# Patient Record
Sex: FEMALE | Race: BLACK OR AFRICAN AMERICAN | NOT HISPANIC OR LATINO | Employment: FULL TIME | URBAN - METROPOLITAN AREA
[De-identification: names, ages, dates, MRNs, and addresses within clinical notes are randomized per-mention and may not be internally consistent; named-entity substitution may affect disease eponyms.]

---

## 2019-08-16 ENCOUNTER — APPOINTMENT (OUTPATIENT)
Dept: CT IMAGING | Facility: HOSPITAL | Age: 54
End: 2019-08-16

## 2019-08-16 ENCOUNTER — HOSPITAL ENCOUNTER (EMERGENCY)
Facility: HOSPITAL | Age: 54
Discharge: HOME | End: 2019-08-16
Attending: EMERGENCY MEDICINE | Admitting: EMERGENCY MEDICINE

## 2019-08-16 VITALS
HEIGHT: 63 IN | HEART RATE: 79 BPM | OXYGEN SATURATION: 96 % | BODY MASS INDEX: 36.32 KG/M2 | WEIGHT: 205 LBS | SYSTOLIC BLOOD PRESSURE: 156 MMHG | RESPIRATION RATE: 18 BRPM | TEMPERATURE: 98.3 F | DIASTOLIC BLOOD PRESSURE: 95 MMHG

## 2019-08-16 DIAGNOSIS — S00.83XA CONTUSION OF FACE, INITIAL ENCOUNTER: ICD-10-CM

## 2019-08-16 DIAGNOSIS — S13.9XXA NECK SPRAIN, INITIAL ENCOUNTER: ICD-10-CM

## 2019-08-16 DIAGNOSIS — S09.90XA INJURY OF HEAD, INITIAL ENCOUNTER: Primary | ICD-10-CM

## 2019-08-16 DIAGNOSIS — S06.0X0A CONCUSSION WITHOUT LOSS OF CONSCIOUSNESS, INITIAL ENCOUNTER: ICD-10-CM

## 2019-08-16 PROCEDURE — 72125 CT NECK SPINE W/O DYE: CPT

## 2019-08-16 PROCEDURE — 70486 CT MAXILLOFACIAL W/O DYE: CPT

## 2019-08-16 PROCEDURE — 99284 EMERGENCY DEPT VISIT MOD MDM: CPT

## 2019-08-16 PROCEDURE — 70450 CT HEAD/BRAIN W/O DYE: CPT

## 2019-08-16 RX ORDER — PREGABALIN 100 MG/1
100 CAPSULE ORAL 2 TIMES DAILY
COMMUNITY
End: 2019-10-16

## 2019-08-16 RX ORDER — ACETAMINOPHEN 325 MG/1
650 TABLET ORAL ONCE
Status: COMPLETED | OUTPATIENT
Start: 2019-08-16 | End: 2019-08-16

## 2019-08-16 RX ORDER — ONDANSETRON 4 MG/1
4 TABLET, ORALLY DISINTEGRATING ORAL ONCE
Status: COMPLETED | OUTPATIENT
Start: 2019-08-16 | End: 2019-08-16

## 2019-08-16 RX ORDER — LOSARTAN POTASSIUM 25 MG/1
25 TABLET ORAL DAILY
COMMUNITY
End: 2019-10-16

## 2019-08-16 RX ORDER — DULOXETIN HYDROCHLORIDE 60 MG/1
60 CAPSULE, DELAYED RELEASE ORAL DAILY
COMMUNITY

## 2019-08-16 RX ADMIN — ONDANSETRON 4 MG: 4 TABLET, ORALLY DISINTEGRATING ORAL at 15:26

## 2019-08-16 RX ADMIN — ACETAMINOPHEN 650 MG: 325 TABLET, FILM COATED ORAL at 15:26

## 2019-08-16 NOTE — ED PROVIDER NOTES
"Subjective   Ms. Magda Begum is a 53 y.o. female who presents to the ED with c/o work injury. She reports she was at work and attempting to move a large mixer when it hit her in the face. She states she was \"in and out\" of consciousness. She was advised to the ED. She now complains of headache, neck pain, nausea, abrasion to the nose, and wound to the left forearm from last week. She denies vomiting and nosebleed. She has a history of fibromyalgia. She is not anticoagulated. She takes Losartan and Lyrica. She has an appointment with a rheumatoid specialist on September 3, 2019. There are no other acute complaints at this time.        History provided by:  Patient  Head Injury   Location:  Frontal  Time since incident:  4 hours  Mechanism of injury: work    Pain details:     Severity:  Moderate    Duration:  4 hours    Timing:  Constant    Progression:  Unchanged  Chronicity:  New  Relieved by:  None tried  Ineffective treatments:  None tried  Associated symptoms: headache, loss of consciousness (she was \"in and out\" of consciousness), nausea and neck pain    Associated symptoms: no vomiting    Risk factors: obesity    Risk factors: not elderly        Review of Systems   HENT: Negative for nosebleeds.    Gastrointestinal: Positive for nausea. Negative for vomiting.   Musculoskeletal: Positive for neck pain.   Skin: Positive for wound (new abrasion to the nose and old abrasion to right forearm).   Neurological: Positive for loss of consciousness (she was \"in and out\" of consciousness) and headaches.   All other systems reviewed and are negative.      Past Medical History:   Diagnosis Date   • Fibromyalgia    • Hypertension        No Known Allergies    Past Surgical History:   Procedure Laterality Date   • HYSTERECTOMY         History reviewed. No pertinent family history.    Social History     Socioeconomic History   • Marital status: Single     Spouse name: Not on file   • Number of children: Not on file   • " Years of education: Not on file   • Highest education level: Not on file   Tobacco Use   • Smoking status: Current Every Day Smoker   Substance and Sexual Activity   • Alcohol use: No     Frequency: Never   • Drug use: No         Objective   Physical Exam   Constitutional: She is oriented to person, place, and time. She appears well-developed and well-nourished.   HENT:   Head: Normocephalic.   Contusion to the center of the forehead.  Diffuse tenderness to the forehead, periorbital region, and the nose.   Eyes: Conjunctivae are normal. No scleral icterus.   Neck: Normal range of motion. Neck supple.   C-spine tenderness upon exam.   Cardiovascular: Normal rate, regular rhythm and normal heart sounds.   No murmur heard.  Pulmonary/Chest: Effort normal and breath sounds normal. No respiratory distress.   Abdominal: Soft. There is no tenderness.   Musculoskeletal: Normal range of motion.   Neurological: She is alert and oriented to person, place, and time.   Skin: Skin is warm and dry.   Abrasion to the bridge of the nose.   Psychiatric: She has a normal mood and affect. Her behavior is normal.   Nursing note and vitals reviewed.      Procedures         ED Course     Discussed results and tx plan with patient. CT's WNL. Pt will f/u with [facility].     No results found for this or any previous visit (from the past 24 hour(s)).  Note: In addition to lab results from this visit, the labs listed above may include labs taken at another facility or during a different encounter within the last 24 hours. Please correlate lab times with ED admission and discharge times for further clarification of the services performed during this visit.    CT Facial Bones Without Contrast   Final Result   No evidence of fracture of the facial bones.       D:  08/16/2019   E:  08/16/2019               This report was finalized on 8/16/2019 6:49 PM by Dr. Hiwot Aldana MD.          CT Head Without Contrast   Final Result   No acute  "intracranial abnormality.       D:  08/16/2019   E:  08/16/2019               This report was finalized on 8/16/2019 6:49 PM by Dr. Hiwot Aldana MD.          CT Cervical Spine Without Contrast   Final Result   Multilevel degenerative changes seen within the cervical   spine with no evidence of fracture or dislocation with straightening of   the normal lordosis.       D:  08/16/2019   E:  08/16/2019               This report was finalized on 8/16/2019 5:18 PM by Dr. Hiwot Aldana MD.            Vitals:    08/16/19 1348   BP: 156/95   BP Location: Left arm   Patient Position: Sitting   Pulse: 79   Resp: 18   Temp: 98.3 °F (36.8 °C)   TempSrc: Oral   SpO2: 96%   Weight: 93 kg (205 lb)   Height: 160 cm (63\")     Medications   ondansetron ODT (ZOFRAN-ODT) disintegrating tablet 4 mg (4 mg Oral Given 8/16/19 1526)   acetaminophen (TYLENOL) tablet 650 mg (650 mg Oral Given 8/16/19 1526)     ECG/EMG Results (last 24 hours)     ** No results found for the last 24 hours. **        No orders to display                     MDM    Final diagnoses:   Injury of head, initial encounter   Contusion of face, initial encounter   Neck sprain, initial encounter   Concussion without loss of consciousness, initial encounter       Documentation assistance provided by yonas Lind.  Information recorded by the scribe was done at my direction and has been verified and validated by me.     Francia Lind  08/16/19 4686       Nancy Pena PA  08/16/19 0411    "

## 2019-08-18 ENCOUNTER — HOSPITAL ENCOUNTER (EMERGENCY)
Facility: HOSPITAL | Age: 54
Discharge: HOME | End: 2019-08-18
Attending: EMERGENCY MEDICINE | Admitting: EMERGENCY MEDICINE

## 2019-08-18 VITALS
BODY MASS INDEX: 36.32 KG/M2 | TEMPERATURE: 98.7 F | RESPIRATION RATE: 18 BRPM | HEIGHT: 63 IN | OXYGEN SATURATION: 98 % | DIASTOLIC BLOOD PRESSURE: 87 MMHG | SYSTOLIC BLOOD PRESSURE: 156 MMHG | WEIGHT: 205 LBS | HEART RATE: 56 BPM

## 2019-08-18 DIAGNOSIS — R11.2 NAUSEA AND VOMITING, INTRACTABILITY OF VOMITING NOT SPECIFIED, UNSPECIFIED VOMITING TYPE: Primary | ICD-10-CM

## 2019-08-18 DIAGNOSIS — R51.9 NONINTRACTABLE EPISODIC HEADACHE, UNSPECIFIED HEADACHE TYPE: ICD-10-CM

## 2019-08-18 DIAGNOSIS — Z87.828: ICD-10-CM

## 2019-08-18 LAB
ALBUMIN SERPL-MCNC: 4.7 G/DL (ref 3.5–5.2)
ALBUMIN/GLOB SERPL: 1.8 G/DL
ALP SERPL-CCNC: 85 U/L (ref 39–117)
ALT SERPL W P-5'-P-CCNC: 21 U/L (ref 1–33)
ANION GAP SERPL CALCULATED.3IONS-SCNC: 13 MMOL/L (ref 5–15)
AST SERPL-CCNC: 20 U/L (ref 1–32)
BASOPHILS # BLD AUTO: 0.09 10*3/MM3 (ref 0–0.2)
BASOPHILS NFR BLD AUTO: 1.2 % (ref 0–1.5)
BILIRUB SERPL-MCNC: 0.2 MG/DL (ref 0.2–1.2)
BILIRUB UR QL STRIP: NEGATIVE
BUN BLD-MCNC: 11 MG/DL (ref 6–20)
BUN/CREAT SERPL: 17.7 (ref 7–25)
CALCIUM SPEC-SCNC: 10 MG/DL (ref 8.6–10.5)
CHLORIDE SERPL-SCNC: 101 MMOL/L (ref 98–107)
CLARITY UR: CLEAR
CO2 SERPL-SCNC: 29 MMOL/L (ref 22–29)
COLOR UR: YELLOW
CREAT BLD-MCNC: 0.62 MG/DL (ref 0.57–1)
DEPRECATED RDW RBC AUTO: 41 FL (ref 37–54)
EOSINOPHIL # BLD AUTO: 0.21 10*3/MM3 (ref 0–0.4)
EOSINOPHIL NFR BLD AUTO: 2.8 % (ref 0.3–6.2)
ERYTHROCYTE [DISTWIDTH] IN BLOOD BY AUTOMATED COUNT: 13.2 % (ref 12.3–15.4)
GFR SERPL CREATININE-BSD FRML MDRD: 122 ML/MIN/1.73
GLOBULIN UR ELPH-MCNC: 2.6 GM/DL
GLUCOSE BLD-MCNC: 92 MG/DL (ref 65–99)
GLUCOSE UR STRIP-MCNC: NEGATIVE MG/DL
HCT VFR BLD AUTO: 47.8 % (ref 34–46.6)
HGB BLD-MCNC: 15.2 G/DL (ref 12–15.9)
HGB UR QL STRIP.AUTO: NEGATIVE
HOLD SPECIMEN: NORMAL
HOLD SPECIMEN: NORMAL
IMM GRANULOCYTES # BLD AUTO: 0.02 10*3/MM3 (ref 0–0.05)
IMM GRANULOCYTES NFR BLD AUTO: 0.3 % (ref 0–0.5)
KETONES UR QL STRIP: NEGATIVE
LEUKOCYTE ESTERASE UR QL STRIP.AUTO: NEGATIVE
LIPASE SERPL-CCNC: 21 U/L (ref 13–60)
LYMPHOCYTES # BLD AUTO: 3.2 10*3/MM3 (ref 0.7–3.1)
LYMPHOCYTES NFR BLD AUTO: 43.2 % (ref 19.6–45.3)
MCH RBC QN AUTO: 27 PG (ref 26.6–33)
MCHC RBC AUTO-ENTMCNC: 31.8 G/DL (ref 31.5–35.7)
MCV RBC AUTO: 85.1 FL (ref 79–97)
MONOCYTES # BLD AUTO: 0.77 10*3/MM3 (ref 0.1–0.9)
MONOCYTES NFR BLD AUTO: 10.4 % (ref 5–12)
NEUTROPHILS # BLD AUTO: 3.11 10*3/MM3 (ref 1.7–7)
NEUTROPHILS NFR BLD AUTO: 42.1 % (ref 42.7–76)
NITRITE UR QL STRIP: NEGATIVE
NRBC BLD AUTO-RTO: 0 /100 WBC (ref 0–0.2)
PH UR STRIP.AUTO: <=5 [PH] (ref 5–8)
PLATELET # BLD AUTO: 328 10*3/MM3 (ref 140–450)
PMV BLD AUTO: 10.3 FL (ref 6–12)
POTASSIUM BLD-SCNC: 3.8 MMOL/L (ref 3.5–5.2)
PROT SERPL-MCNC: 7.3 G/DL (ref 6–8.5)
PROT UR QL STRIP: NEGATIVE
RBC # BLD AUTO: 5.62 10*6/MM3 (ref 3.77–5.28)
SODIUM BLD-SCNC: 143 MMOL/L (ref 136–145)
SP GR UR STRIP: 1.02 (ref 1–1.03)
UROBILINOGEN UR QL STRIP: NORMAL
WBC NRBC COR # BLD: 7.4 10*3/MM3 (ref 3.4–10.8)
WHOLE BLOOD HOLD SPECIMEN: NORMAL
WHOLE BLOOD HOLD SPECIMEN: NORMAL

## 2019-08-18 PROCEDURE — 96374 THER/PROPH/DIAG INJ IV PUSH: CPT

## 2019-08-18 PROCEDURE — 80053 COMPREHEN METABOLIC PANEL: CPT | Performed by: EMERGENCY MEDICINE

## 2019-08-18 PROCEDURE — 96375 TX/PRO/DX INJ NEW DRUG ADDON: CPT

## 2019-08-18 PROCEDURE — 25010000002 ONDANSETRON PER 1 MG: Performed by: NURSE PRACTITIONER

## 2019-08-18 PROCEDURE — 25010000002 KETOROLAC TROMETHAMINE PER 15 MG: Performed by: NURSE PRACTITIONER

## 2019-08-18 PROCEDURE — 81003 URINALYSIS AUTO W/O SCOPE: CPT | Performed by: EMERGENCY MEDICINE

## 2019-08-18 PROCEDURE — 85025 COMPLETE CBC W/AUTO DIFF WBC: CPT | Performed by: EMERGENCY MEDICINE

## 2019-08-18 PROCEDURE — 99284 EMERGENCY DEPT VISIT MOD MDM: CPT

## 2019-08-18 PROCEDURE — 83690 ASSAY OF LIPASE: CPT | Performed by: EMERGENCY MEDICINE

## 2019-08-18 RX ORDER — KETOROLAC TROMETHAMINE 30 MG/ML
30 INJECTION, SOLUTION INTRAMUSCULAR; INTRAVENOUS ONCE
Status: COMPLETED | OUTPATIENT
Start: 2019-08-18 | End: 2019-08-18

## 2019-08-18 RX ORDER — LOSARTAN POTASSIUM AND HYDROCHLOROTHIAZIDE 25; 100 MG/1; MG/1
1 TABLET ORAL DAILY
COMMUNITY

## 2019-08-18 RX ORDER — SODIUM CHLORIDE 0.9 % (FLUSH) 0.9 %
10 SYRINGE (ML) INJECTION AS NEEDED
Status: DISCONTINUED | OUTPATIENT
Start: 2019-08-18 | End: 2019-08-18 | Stop reason: HOSPADM

## 2019-08-18 RX ORDER — ONDANSETRON 2 MG/ML
4 INJECTION INTRAMUSCULAR; INTRAVENOUS ONCE
Status: COMPLETED | OUTPATIENT
Start: 2019-08-18 | End: 2019-08-18

## 2019-08-18 RX ORDER — ONDANSETRON 4 MG/1
4 TABLET, ORALLY DISINTEGRATING ORAL EVERY 8 HOURS PRN
Qty: 14 TABLET | Refills: 0 | Status: SHIPPED | OUTPATIENT
Start: 2019-08-18 | End: 2019-10-16

## 2019-08-18 RX ADMIN — SODIUM CHLORIDE 1000 ML: 9 INJECTION, SOLUTION INTRAVENOUS at 14:41

## 2019-08-18 RX ADMIN — ONDANSETRON 4 MG: 2 INJECTION INTRAMUSCULAR; INTRAVENOUS at 15:35

## 2019-08-18 RX ADMIN — KETOROLAC TROMETHAMINE 30 MG: 30 INJECTION, SOLUTION INTRAMUSCULAR at 15:35

## 2019-08-18 NOTE — ED PROVIDER NOTES
Subjective   Magda Begum is a 53 y.o. female who presents to the ED with complaints of nausea and vomiting since yesterday. She was seen here 2 days ago for a heaf injury at work. All her labs/imaging were normal. However, she states she has worsening head and neck pain and has developed nausea and vomiting. She also complains of abdominal pain, right sided myalgias, decreased urine, and sleep disturbance secondary to her pain. She denies any fever,chills, or shortness of breath. She denies any suspicious food intake or re-injury. She has a history of hypertension. She relates she smokes. There are no other acute complaints at this time.        History provided by:  Patient  Vomiting   The primary symptoms include abdominal pain, nausea, vomiting and myalgias (right side of her body). Primary symptoms do not include fever. The illness began yesterday. The onset was sudden. The problem has not changed since onset.  The illness does not include chills.       Review of Systems   Constitutional: Negative for chills and fever.   HENT:        Head pain   Respiratory: Negative for shortness of breath.    Gastrointestinal: Positive for abdominal pain, nausea and vomiting.   Genitourinary: Positive for decreased urine volume.   Musculoskeletal: Positive for myalgias (right side of her body) and neck pain.   Psychiatric/Behavioral: Positive for sleep disturbance (secondary to pain).   All other systems reviewed and are negative.      Past Medical History:   Diagnosis Date   • Fibromyalgia    • Hypertension        No Known Allergies    Past Surgical History:   Procedure Laterality Date   • HYSTERECTOMY         History reviewed. No pertinent family history.    Social History     Socioeconomic History   • Marital status: Single     Spouse name: Not on file   • Number of children: Not on file   • Years of education: Not on file   • Highest education level: Not on file   Tobacco Use   • Smoking status: Current Every Day  Smoker   Substance and Sexual Activity   • Alcohol use: No     Frequency: Never   • Drug use: No         Objective   Physical Exam   Constitutional: She is oriented to person, place, and time. She appears well-developed and well-nourished.   HENT:   Head: Normocephalic and atraumatic.   Nose: Nose normal.   Eyes: Conjunctivae are normal. No scleral icterus.   Neck: Normal range of motion. Neck supple.   Cardiovascular: Normal rate, regular rhythm and normal heart sounds.   No murmur heard.  Pulmonary/Chest: Effort normal and breath sounds normal. No respiratory distress.   Abdominal: Soft. She exhibits no distension.   Musculoskeletal: Normal range of motion.   Neurological: She is alert and oriented to person, place, and time. No cranial nerve deficit or sensory deficit.   No neurosensory deficits or focal weakness.    Skin: Skin is warm and dry.   Psychiatric: She has a normal mood and affect. Her behavior is normal.   Nursing note and vitals reviewed.      Procedures         ED Course  ED Course as of Aug 18 1556   Sun Aug 18, 2019   1553 Patient is feeling better after administration of IV fluids.  She is drinking a soda and crackers and tolerating them well.  She has no abdominal pain.  She has had no further vomiting.  Her blood pressure is improved.  She agrees with outpatient follow-up.  [MS]      ED Course User Index  [MS] Sara Joshua, IGNACIO     Recent Results (from the past 24 hour(s))   Comprehensive Metabolic Panel    Collection Time: 08/18/19  2:39 PM   Result Value Ref Range    Glucose 92 65 - 99 mg/dL    BUN 11 6 - 20 mg/dL    Creatinine 0.62 0.57 - 1.00 mg/dL    Sodium 143 136 - 145 mmol/L    Potassium 3.8 3.5 - 5.2 mmol/L    Chloride 101 98 - 107 mmol/L    CO2 29.0 22.0 - 29.0 mmol/L    Calcium 10.0 8.6 - 10.5 mg/dL    Total Protein 7.3 6.0 - 8.5 g/dL    Albumin 4.70 3.50 - 5.20 g/dL    ALT (SGPT) 21 1 - 33 U/L    AST (SGOT) 20 1 - 32 U/L    Alkaline Phosphatase 85 39 - 117 U/L    Total  Bilirubin 0.2 0.2 - 1.2 mg/dL    eGFR  African Amer 122 >60 mL/min/1.73    Globulin 2.6 gm/dL    A/G Ratio 1.8 g/dL    BUN/Creatinine Ratio 17.7 7.0 - 25.0    Anion Gap 13.0 5.0 - 15.0 mmol/L   Lipase    Collection Time: 08/18/19  2:39 PM   Result Value Ref Range    Lipase 21 13 - 60 U/L   Urinalysis With Microscopic If Indicated (No Culture) - Urine, Clean Catch    Collection Time: 08/18/19  2:39 PM   Result Value Ref Range    Color, UA Yellow Yellow, Straw    Appearance, UA Clear Clear    pH, UA <=5.0 5.0 - 8.0    Specific Gravity, UA 1.016 1.001 - 1.030    Glucose, UA Negative Negative    Ketones, UA Negative Negative    Bilirubin, UA Negative Negative    Blood, UA Negative Negative    Protein, UA Negative Negative    Leuk Esterase, UA Negative Negative    Nitrite, UA Negative Negative    Urobilinogen, UA 0.2 E.U./dL 0.2 - 1.0 E.U./dL   Light Blue Top    Collection Time: 08/18/19  2:39 PM   Result Value Ref Range    Extra Tube hold for add-on    Green Top (Gel)    Collection Time: 08/18/19  2:39 PM   Result Value Ref Range    Extra Tube Hold for add-ons.    Lavender Top    Collection Time: 08/18/19  2:39 PM   Result Value Ref Range    Extra Tube hold for add-on    Gold Top - SST    Collection Time: 08/18/19  2:39 PM   Result Value Ref Range    Extra Tube Hold for add-ons.    CBC Auto Differential    Collection Time: 08/18/19  2:39 PM   Result Value Ref Range    WBC 7.40 3.40 - 10.80 10*3/mm3    RBC 5.62 (H) 3.77 - 5.28 10*6/mm3    Hemoglobin 15.2 12.0 - 15.9 g/dL    Hematocrit 47.8 (H) 34.0 - 46.6 %    MCV 85.1 79.0 - 97.0 fL    MCH 27.0 26.6 - 33.0 pg    MCHC 31.8 31.5 - 35.7 g/dL    RDW 13.2 12.3 - 15.4 %    RDW-SD 41.0 37.0 - 54.0 fl    MPV 10.3 6.0 - 12.0 fL    Platelets 328 140 - 450 10*3/mm3    Neutrophil % 42.1 (L) 42.7 - 76.0 %    Lymphocyte % 43.2 19.6 - 45.3 %    Monocyte % 10.4 5.0 - 12.0 %    Eosinophil % 2.8 0.3 - 6.2 %    Basophil % 1.2 0.0 - 1.5 %    Immature Grans % 0.3 0.0 - 0.5 %    Neutrophils,  "Absolute 3.11 1.70 - 7.00 10*3/mm3    Lymphocytes, Absolute 3.20 (H) 0.70 - 3.10 10*3/mm3    Monocytes, Absolute 0.77 0.10 - 0.90 10*3/mm3    Eosinophils, Absolute 0.21 0.00 - 0.40 10*3/mm3    Basophils, Absolute 0.09 0.00 - 0.20 10*3/mm3    Immature Grans, Absolute 0.02 0.00 - 0.05 10*3/mm3    nRBC 0.0 0.0 - 0.2 /100 WBC     Note: In addition to lab results from this visit, the labs listed above may include labs taken at another facility or during a different encounter within the last 24 hours. Please correlate lab times with ED admission and discharge times for further clarification of the services performed during this visit.    No orders to display     Vitals:    08/18/19 1221 08/18/19 1434 08/18/19 1436 08/18/19 1538   BP: 177/85 171/92  152/94   BP Location: Left arm      Patient Position: Sitting      Pulse: 76  56 59   Resp: 18      Temp: 98.7 °F (37.1 °C)      TempSrc: Oral      SpO2: 96% 98% 95% 96%   Weight: 93 kg (205 lb)      Height: 160 cm (63\")        Medications   sodium chloride 0.9 % flush 10 mL (not administered)   sodium chloride 0.9 % flush 10 mL (not administered)   sodium chloride 0.9 % bolus 1,000 mL (0 mL Intravenous Stopped 8/18/19 1511)   ketorolac (TORADOL) injection 30 mg (30 mg Intravenous Given 8/18/19 1535)   ondansetron (ZOFRAN) injection 4 mg (4 mg Intravenous Given 8/18/19 1535)     ECG/EMG Results (last 24 hours)     ** No results found for the last 24 hours. **        No orders to display                       MDM    Final diagnoses:   Nausea and vomiting, intractability of vomiting not specified, unspecified vomiting type   History of head injury without skull fracture   Nonintractable episodic headache, unspecified headache type       Documentation assistance provided by yonas Sahu.  Information recorded by the scribe was done at my direction and has been verified and validated by me.     Rebecca Sahu  08/18/19 1531       Sara Joshua, APRN  08/18/19 1556    "

## 2019-08-18 NOTE — DISCHARGE INSTRUCTIONS
Home to rest.  Drink ample clear fluids.  Follow-up with a primary care provider to monitor your recovery.  Below is a list of local providers with whom you may establish a relationship.  Thank you    Follow up with one of the [facility] Primary Care Providers below to setup primary care. If you need assistance coordinating a primary care appointment with a [facility] Primary Care Provider, please contact the Primary Care Coordinators at [phone] for appointment scheduling.    [facility], Primary Care   [address], [address]   [place], [place] [address] [phone]    [facility] Internal Medicine & Endocrinology  [address], [address]  [place] [address] [phone]    [facility] Family Medicine  [address], [address]   [place] [address] [phone]    [facility] Primary Care  [address], [address]  [place], [place] [address] [phone]    [facility], Primary Care,   [address], [address]   [place], [place] [address] [phone]    [facility] Primary Care  [address], [address]  [place], [place] [address] [phone]    [facility], Primary Care  [address], [address]  [place], [place] [address] [phone]    [facility] Internal Medicine & Pediatrics  [address], [address]   [place] [address] [phone]    [facility], Primary Care  [address] [place] [address] [phone]      [facility] Primary Care  [address], [address]   [place] [address] [phone]    [facility] Family Medicine  [address] [place] [address] [phone]    Follow up with one of the physician centers below to setup primary care.    [facility]  [facility], [phone], [address], [address], [place], [facility]    [facility], [phone], [address], [address] [place] [address], [phone], [address] [place], [facility] [place];     [facility], [phone], [address] [address]

## 2019-09-30 ENCOUNTER — TRANSCRIBE ORDERS (OUTPATIENT)
Dept: PHYSICAL THERAPY | Facility: CLINIC | Age: 54
End: 2019-09-30

## 2019-09-30 DIAGNOSIS — M54.2 CERVICAL PAIN: Primary | ICD-10-CM

## 2019-09-30 DIAGNOSIS — M25.511 BILATERAL SHOULDER PAIN, UNSPECIFIED CHRONICITY: ICD-10-CM

## 2019-09-30 DIAGNOSIS — S00.93XA CONTUSION OF HEAD, UNSPECIFIED PART OF HEAD, INITIAL ENCOUNTER: ICD-10-CM

## 2019-09-30 DIAGNOSIS — M25.512 BILATERAL SHOULDER PAIN, UNSPECIFIED CHRONICITY: ICD-10-CM

## 2019-10-16 ENCOUNTER — OFFICE VISIT (OUTPATIENT)
Dept: NEUROLOGY | Facility: CLINIC | Age: 54
End: 2019-10-16

## 2019-10-16 VITALS
OXYGEN SATURATION: 98 % | SYSTOLIC BLOOD PRESSURE: 126 MMHG | HEIGHT: 63 IN | WEIGHT: 235 LBS | DIASTOLIC BLOOD PRESSURE: 80 MMHG | BODY MASS INDEX: 41.64 KG/M2 | HEART RATE: 72 BPM

## 2019-10-16 DIAGNOSIS — G44.329 CHRONIC POST-TRAUMATIC HEADACHE, NOT INTRACTABLE: ICD-10-CM

## 2019-10-16 DIAGNOSIS — M54.12 CERVICAL RADICULOPATHY: Primary | ICD-10-CM

## 2019-10-16 PROBLEM — G44.309 POST-TRAUMATIC HEADACHE: Status: ACTIVE | Noted: 2019-10-16

## 2019-10-16 PROCEDURE — 99204 OFFICE O/P NEW MOD 45 MIN: CPT | Performed by: PSYCHIATRY & NEUROLOGY

## 2019-10-16 RX ORDER — PROMETHAZINE HYDROCHLORIDE 25 MG/1
25 TABLET ORAL EVERY 6 HOURS PRN
COMMUNITY
End: 2020-03-17 | Stop reason: SDUPTHER

## 2019-10-16 RX ORDER — AMITRIPTYLINE HYDROCHLORIDE 25 MG/1
TABLET, FILM COATED ORAL
Qty: 30 TABLET | Refills: 3 | Status: SHIPPED | OUTPATIENT
Start: 2019-10-16 | End: 2019-12-09 | Stop reason: SDUPTHER

## 2019-10-16 RX ORDER — CYCLOBENZAPRINE HCL 5 MG
7.5 TABLET ORAL NIGHTLY
Qty: 21 TABLET | Refills: 0 | Status: SHIPPED | OUTPATIENT
Start: 2019-10-16 | End: 2019-10-30

## 2019-10-16 RX ORDER — CHOLECALCIFEROL (VITAMIN D3) 125 MCG
CAPSULE ORAL
COMMUNITY
End: 2020-11-19

## 2019-10-16 NOTE — PROGRESS NOTES
Subjective:    CC: Magda Begum is seen today in consultation at the request of MAYE Calderon for Headache and Vision Disturbance       HPI:  53-year-old -American female with a history of hypertension, fibromyalgia presents with headaches and neck pain after she sustained her injury at work.  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  After that she went to the ER where she had a CT head and CT facial bones that was unremarkable as well as a CT cervical spine that showed multilevel degenerative changes without any fractures or dislocations.  Since then she has had frequent headaches about 4 times a week along with nausea and constant photophobia.  She typically takes Phenergan and gets a Toradol injection when the headaches become severe.  Also tried Elavil at low doses in the past that did not help much.  In addition she has been getting constant neck pain on the right that radiates down her right arm.  Has used heat and ice packs but that does not help much.  Of note-I reviewed her ER notes as summarized above.    The following portions of the patient's history were reviewed today and updated as of 10/16/2019  : allergies, current medications, past family history, past medical history, past social history, past surgical history and problem list  These document will be scanned to patient's chart.      Current Outpatient Medications:   •  Cholecalciferol (VITAMIN D3) 2000 units tablet, Take  by mouth., Disp: , Rfl:   •  DULoxetine (CYMBALTA) 60 MG capsule, Take 60 mg by mouth Daily., Disp: , Rfl:   •  Ketorolac Tromethamine (TORADOL IJ), Inject  as directed As Needed., Disp: , Rfl:   •  losartan-hydrochlorothiazide (HYZAAR) 100-25 MG per tablet, Take 1 tablet by mouth Daily., Disp: , Rfl:   •  promethazine (PHENERGAN) 25 MG tablet, Take 25 mg by mouth Every 6 (Six) Hours As Needed for Nausea or Vomiting.,  "Disp: , Rfl:    Past Medical History:   Diagnosis Date   • Fibromyalgia    • Hypertension       Past Surgical History:   Procedure Laterality Date   • HYSTERECTOMY        Family History   Problem Relation Age of Onset   • Heart disease Mother    • Hypertension Mother    • Cancer Father    • Diabetes Father       Social History     Socioeconomic History   • Marital status: Single     Spouse name: Not on file   • Number of children: Not on file   • Years of education: Not on file   • Highest education level: Not on file   Tobacco Use   • Smoking status: Current Every Day Smoker   Substance and Sexual Activity   • Alcohol use: No     Frequency: Never   • Drug use: No   • Sexual activity: Defer     Review of Systems   Constitutional: Positive for fatigue.   HENT: Positive for ear pain.    Eyes: Positive for photophobia.   Gastrointestinal: Positive for nausea.   Musculoskeletal: Positive for back pain, neck pain and neck stiffness.   Neurological: Positive for dizziness, headache and confusion.   Psychiatric/Behavioral: The patient is nervous/anxious.    All other systems reviewed and are negative.      Objective:    /80   Pulse 72   Ht 160 cm (63\")   Wt 107 kg (235 lb)   SpO2 98%   BMI 41.63 kg/m²     Neurology Exam:    General apperance: NAD.  Wearing dark glasses as bright lights bother her    Mental status: Alert, awake and oriented to time place and person.    Recent and Remote memory: Intact.    Attention span and Concentration: Normal.     Language and Speech: Intact- No dysarthria.    Fluency, Naming , Repitition and Comprehension:  Intact    Cranial Nerves:   CN II: Visual fields are full. Intact. Fundi - Normal, No papillederma, Pupils - [place]  CN III, IV and VI: Extraocular movements are intact. Normal saccades.   CN V: Facial sensation is intact.   CN VII: Muscles of facial expression reveal no asymmetry. Intact.   CN VIII: Hearing is intact. Whispered voice intact.   CN IX and X: Palate elevates " symmetrically. Intact  CN XI: Shoulder shrug is intact.   CN XII: Tongue is midline without evidence of atrophy or fasciculation.     Ophthalmoscopic exam of optic disc-normal    Motor:-Has tenderness to palpation on the right side of her neck    Right UE muscle strength 4/5.  Poor effort, normal tone.     Left UE muscle strength 5/5. Normal tone.      Right LE muscle strength5/5. Normal tone.     Left LE muscle strength 5/5. Normal tone.      Sensory: Normal light touch, vibration and pinprick sensation bilaterally.    DTRs: 3+ in right upper extremity.  2+  in left upper and lower extremities.    Babinski: Negative bilaterally.    Co-ordination: Normal finger-to-nose, heel to shin B/L.    Rhomberg: Negative.    Gait: Normal.    Cardiovascular: Regular rate and rhythm without murmur, gallop or rub.    Assessment and Plan:  1. Cervical radiculopathy  Neck pain radiating down to the right arm.  CT C-spine showed multilevel degenerative changes.  Patient has used over-the-counter medications without much relief in her pain  Hence I will get a MRI C-spine.  We will also give her a 2-week course of Flexeril    2. Chronic post-traumatic headache, not intractable  Will start her on amitriptyline at higher doses  She can start off at 12.5 mg for a week and then increase to 25 mg at night thereafter       Return in about 6 weeks (around 11/27/2019).         Abi Waller MD

## 2019-10-22 ENCOUNTER — TELEPHONE (OUTPATIENT)
Dept: NEUROLOGY | Facility: CLINIC | Age: 54
End: 2019-10-22

## 2019-10-22 NOTE — TELEPHONE ENCOUNTER
----- Message from Miri Burris sent at 10/21/2019 10:49 AM EDT -----  Contact: GWENDOLYN BENTON @[facility] [phone]  Gwendolyn Breaux from [facility] [employer],  called requesting appointment notes and work status on pt. Any questions please call @[phone]. Please fax requested items to [phone].

## 2019-10-22 NOTE — TELEPHONE ENCOUNTER
You can send the notes.  Do you remember we gave the patient a letter stating that she can be off for another month and can resume working next month (I think it was November 15)

## 2019-10-31 ENCOUNTER — TELEPHONE (OUTPATIENT)
Dept: NEUROLOGY | Facility: CLINIC | Age: 54
End: 2019-10-31

## 2019-10-31 RX ORDER — LORAZEPAM 1 MG/1
1 TABLET ORAL ONCE
Qty: 2 TABLET | Refills: 0 | Status: SHIPPED | OUTPATIENT
Start: 2019-10-31 | End: 2019-10-31

## 2019-10-31 RX ORDER — LORAZEPAM 1 MG/1
1 TABLET ORAL ONCE
Qty: 2 TABLET | Refills: 0 | Status: SHIPPED | OUTPATIENT
Start: 2019-10-31 | End: 2019-10-31 | Stop reason: SDUPTHER

## 2019-10-31 NOTE — TELEPHONE ENCOUNTER
Spoke with pt and informed her that  has sent in Ativan 1 MG 2 Tabs. Advised she will take 1 30 minutes prior to MRI and only take 2nd one if the 1st one doesn't work. Also advised that she should NOT drive after taking the medication. Pt acknowledge understanding and states that she will get a ride.

## 2019-10-31 NOTE — TELEPHONE ENCOUNTER
----- Message from Wale Boyd sent at 10/31/2019  9:37 AM EDT -----  Contact: [phone]  Dr. Waller,    Pt called stating her MRI is next week 11/5 at 6:30 am and pt is wondering if something can be called in beforehand, to [facility], due to pt stating she is very Claustrophobic. Please advise.

## 2019-10-31 NOTE — TELEPHONE ENCOUNTER
Tell her that I have called in Ativan 1 mg, 2 tabs .  She can take 1 tablet 30 minutes prior to her MRI.  Can take a second tablet only if the first 1 does not help.  She should not drive after taking Ativan

## 2019-10-31 NOTE — TELEPHONE ENCOUNTER
Pt medication was sent to wrong pharmacy.  I have pend the medication with updated pharmacy. Please advise.

## 2019-11-05 ENCOUNTER — HOSPITAL ENCOUNTER (OUTPATIENT)
Dept: MRI IMAGING | Facility: HOSPITAL | Age: 54
Discharge: HOME | End: 2019-11-05
Admitting: PSYCHIATRY & NEUROLOGY

## 2019-11-05 DIAGNOSIS — M54.12 CERVICAL RADICULOPATHY: ICD-10-CM

## 2019-11-05 PROCEDURE — 72141 MRI NECK SPINE W/O DYE: CPT

## 2019-11-06 ENCOUNTER — TELEPHONE (OUTPATIENT)
Dept: NEUROLOGY | Facility: CLINIC | Age: 54
End: 2019-11-06

## 2019-11-06 NOTE — TELEPHONE ENCOUNTER
----- Message from Abi Waller MD sent at 11/5/2019  1:15 PM EST -----  Can you let her know that she has mild disc bulges at 2 levels but no severe stenosis that may need her to have surgery.  Hence I would recommend getting physical therapy for the pain in her neck that goes down.  Let me know if she agrees and I can send in a referral

## 2019-11-06 NOTE — TELEPHONE ENCOUNTER
That  bulging disks are not due to the injury.  I feel she should still go back to work on Friday.  She can use the glasses at work if she is still photosensitive.  Also I can give her a medicine for nausea if she wants.  Ask her once again if she wants a referral for physical therapy?

## 2019-11-06 NOTE — TELEPHONE ENCOUNTER
Called and informed the patient. Pt stated she wants to know that this come from the injury? Since she has neck pain.     She also stated that her eyes are sensitive and also she is still really nauseous. So she wants to know if you have any suggests for her going back to work Friday. Please advise.

## 2019-11-07 DIAGNOSIS — M54.12 CERVICAL RADICULOPATHY: Primary | ICD-10-CM

## 2019-11-07 NOTE — TELEPHONE ENCOUNTER
Called and informed the patient.   Pt stated that she would like the referral to PT to [facility].

## 2019-11-18 ENCOUNTER — TREATMENT (OUTPATIENT)
Dept: PHYSICAL THERAPY | Facility: CLINIC | Age: 54
End: 2019-11-18

## 2019-11-18 DIAGNOSIS — M54.12 CERVICAL RADICULOPATHY: Primary | ICD-10-CM

## 2019-11-18 PROCEDURE — 97162 PT EVAL MOD COMPLEX 30 MIN: CPT | Performed by: PHYSICAL THERAPIST

## 2019-11-18 NOTE — PROGRESS NOTES
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  States she became light headed after the injury, not sure if she was knocked unconscious      Went to ER on Saturday for 'walking pneumonia'; coughing a lot    Was sent here after the work injury, was sent to the ER   Recent MRI showed bulging disk in cervical spine and multi-level arthritis     Has not worked since her injury    States she is still nauseous, has headaches     8/10 neck pain at rest ; pain increases with movement, sleeping, lifting arm  Going to a MD tomorrow at [facility] - chose this on her own    Has a f/u with neurologist next month, does not want to wait for this appt- 'I need a solution for this now'     Takes ibuprofen, does not take the flexeril anymore, wasn't helping   Uses frozen peas  States that she was supposed to get into physical therapy 'months ago' but it was never approved; states she was getting Toradol shots twice a week for a long time' 'took the edge off'     Wears sunglasses in the clinic secondary to photophobia  States the light makes her eyes hurt    PMH: FMS        Patient Occupation: cook at [employer]    Precautions and Work Restrictions: has not worked since the injuryQuality of life: fair    Pain  Current pain ratin  Location: neck  Quality: sharp and radiating  Relieving factors: ice, medications and change in position  Aggravating factors: lifting, movement, overhead activity and sleeping  Progression: worsening    Hand dominance: right    Patient Goals  Patient goals for therapy: decreased pain and increased motion             Objective       Postural Observations  Seated  posture: poor  Standing posture: poor        Palpation     Right Tenderness of the cervical paraspinals and upper trapezius.     Neurological Testing     Reflexes   Left   Biceps (C5/C6): normal (2+)  Brachioradialis (C6): normal (2+)  Triceps (C7): normal (2+)    Right   Biceps (C5/C6): normal (2+)  Brachioradialis (C6): normal (2+)  Triceps (C7): normal (2+)    Active Range of Motion   Cervical/Thoracic Spine   Cervical    Flexion: WFL and with pain  Extension: 20 degrees with pain  Left lateral flexion: 5 degrees with pain  Right lateral flexion: 5 degrees with pain  Left rotation: 20 degrees with pain  Right rotation: 20 degrees with pain    Additional Active Range of Motion Details  Apprehensive with all movements    Shoulder ROM to 80 degrees on right, 120 on left        Strength/Myotome Testing     Additional Strength Details  Strength of UE's grossly 4+/5 on left, 4/5 on right       strength:   33# on right, 15# on left      Tests     Additional Tests Details  Difficulty testing    Patient was coughing uncontrollably during entire evaluation; seems very ill today; states she was diagnosed with 'walking pneumonia' at the ER         Assessment & Plan     Assessment  Impairments: abnormal or restricted ROM, activity intolerance, impaired physical strength, lacks appropriate home exercise program and pain with function  Assessment details: Patient presents with 3-4 month history of cervical pain after a injury at work in which a mixer fell onto her face while she was moving it; has not done any previous PT; has been diagnosed with 'post-concussive syndrome'; imaging reveals multi-level arthritis and possible disc pathology at C5-6,6-7 ; complains of neck pain, decreased ROM, nausea, headaches, difficulty sleeping,  photophobia; has not worked since the injury that occurred in August; saw neurologist, but has made her own appointment for second opinion at [facility], states she is looking for a faster  solution ; patient shows apprehension with all motions, decreased UE ROM; patient was very ill during her evaluation today, coughing uncontrollably ; may benefit from PT for ROM, strengthening, modalities, manual therapy in order to RTW on full duty without pain or dysfunction   Prognosis: fair  Prognosis details: Chronic pain, injury occurred in August  Continued headaches, nausea, photophobia since August  FMS    Functional Limitations: carrying objects, lifting, sleeping, pulling, pushing, uncomfortable because of pain and reaching overhead  Goals  Plan Goals: 4 weeks:  1. IND with HEP  2. Full cervical and UE AROM  3.  strength > 50 pounds bilaterally  4. 5/5 MMT strength of bilateral UE's  5. Able to perform work simulation tasks without pain or dysfunction    Plan  Therapy options: will be seen for skilled physical therapy services  Planned modality interventions: iontophoresis, TENS, thermotherapy (hydrocollator packs), traction, ultrasound, high voltage pulsed current (pain management), electrical stimulation/Russian stimulation and cryotherapy  Planned therapy interventions: manual therapy, neuromuscular re-education, postural training, soft tissue mobilization, spinal/joint mobilization, strengthening, stretching, therapeutic activities, joint mobilization, home exercise program, functional ROM exercises, flexibility and body mechanics training  Frequency: 2x week  Duration in visits: 8  Treatment plan discussed with: patient        Manual Therapy:         mins  46474;  Therapeutic Exercise:         mins  81727;     Neuromuscular Karmen:        mins  71214;    Therapeutic Activity:          mins  40357;     Gait Training:           mins  07501;     Ultrasound:          mins  49806;    Electrical Stimulation:         mins  54515 ( );  Dry Needling          mins self-pay    Timed Treatment:      mins   Total Treatment:     45   mins    PT SIGNATURE: Ricardo Howard, SOREN   DATE TREATMENT INITIATED:  11/18/2019    Initial Certification  Certification Period: 2/16/2020  I certify that the therapy services are furnished while this patient is under my care.  The services outlined above are required by this patient, and will be reviewed every 90 days.     PHYSICIAN: Abi Waller MD      DATE:     Please sign and return via fax to  .. Thank you, [facility].

## 2019-11-19 ENCOUNTER — TELEPHONE (OUTPATIENT)
Dept: NEUROLOGY | Facility: CLINIC | Age: 54
End: 2019-11-19

## 2019-11-19 NOTE — TELEPHONE ENCOUNTER
I had started her on amitriptyline.  Can you ask her if she started taking that?  Also I am not sure how I can help her since she was unable to do the physical therapy hence which she like to see a pain physician, Dr. Hodge

## 2019-11-19 NOTE — TELEPHONE ENCOUNTER
----- Message from Miri Burris sent at 11/18/2019  4:21 PM EST -----  Contact: Magda [phone]  Dr. Waller    Magda went back to work on 11/15/19, and pt was unable to make it through her shift. Pt stated she is experiencing severe neck pain, which travels to shoulder, arms and hands. Pt stated that she has the worst headache and nausea, pt has been taken RX promethazine (PHENERGAN) 25 MG tablet . Pt stated that physical therapy could not do anything because it has not been approved through worker's comp. Pt stated that she is able to work like this. Please call and advise.

## 2019-11-19 NOTE — TELEPHONE ENCOUNTER
Pt stated that she went ahead and had a PT evaluation. So we can wait on that. Pt stated that she does not want to see a pain physician. I informed her about the FCE she is okay with that.   She wants to know if you can switch the rx to zofran because she can take that during the day at work.

## 2019-11-19 NOTE — TELEPHONE ENCOUNTER
Can you please send a prescription of Zofran 4 mg.  As far as the [employer]CS evaluation is concerned let us just wait on her PT evaluation results first

## 2019-11-20 RX ORDER — ONDANSETRON 4 MG/1
4 TABLET, FILM COATED ORAL DAILY PRN
Qty: 15 TABLET | Refills: 1 | Status: SHIPPED | OUTPATIENT
Start: 2019-11-20 | End: 2019-12-09

## 2019-12-09 ENCOUNTER — OFFICE VISIT (OUTPATIENT)
Dept: NEUROLOGY | Facility: CLINIC | Age: 54
End: 2019-12-09

## 2019-12-09 VITALS
OXYGEN SATURATION: 97 % | HEART RATE: 79 BPM | HEIGHT: 63 IN | DIASTOLIC BLOOD PRESSURE: 80 MMHG | SYSTOLIC BLOOD PRESSURE: 124 MMHG | BODY MASS INDEX: 41.11 KG/M2 | WEIGHT: 232 LBS

## 2019-12-09 DIAGNOSIS — M54.12 CERVICAL RADICULOPATHY: ICD-10-CM

## 2019-12-09 DIAGNOSIS — G44.329 CHRONIC POST-TRAUMATIC HEADACHE, NOT INTRACTABLE: Primary | ICD-10-CM

## 2019-12-09 PROCEDURE — 99214 OFFICE O/P EST MOD 30 MIN: CPT | Performed by: PSYCHIATRY & NEUROLOGY

## 2019-12-09 RX ORDER — AMITRIPTYLINE HYDROCHLORIDE 25 MG/1
TABLET, FILM COATED ORAL
Qty: 60 TABLET | Refills: 3 | Status: SHIPPED | OUTPATIENT
Start: 2019-12-09 | End: 2020-02-12 | Stop reason: SDUPTHER

## 2019-12-09 NOTE — PROGRESS NOTES
Subjective:    CC: Magda Begum is seen today for Neck Pain and headaches       HPI:  Current visit- since the last visit patient went back to work on November 15 however her headaches and neck pain were so debilitating that she had to take another week off.  After that she resumed work on November 22 and is able to work now by wearing dark glasses as she is extremely photosensitive.  Also continues to have headaches at least 2-3 times a week in addition to neck pain on the right that occasionally radiates down her arm.  Started taking amitriptyline 25 mg which has helped a little.  Also takes Zofran for nausea.  Her MRI of the cervical spine that I personally reviewed showed minor disc bulges but no severe spinal stenosis.  I had sent her to physical therapy for her neck pain however it was not approved by [employer].    Initial [id number] -American female with a history of hypertension, fibromyalgia presents with headaches and neck pain after she sustained her injury at work.  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  After that she went to the ER where she had a CT head and CT facial bones that was unremarkable as well as a CT cervical spine that showed multilevel degenerative changes without any fractures or dislocations.  Since then she has had frequent headaches about 4 times a week along with nausea and constant photophobia.  She typically takes Phenergan and gets a Toradol injection when the headaches become severe.  Also tried Elavil at low doses in the past that did not help much.  In addition she has been getting constant neck pain on the right that radiates down her right arm.  Has used heat and ice packs but that does not help much.    The following portions of the patient's history were reviewed today and updated as of 12/09/2019  : allergies, current medications, past family history,  "past medical history, past social history, past surgical history and problem list  These document will be scanned to patient's chart.      Current Outpatient Medications:   •  amitriptyline (ELAVIL) 25 MG tablet, Take 2 tablets at night, Disp: 60 tablet, Rfl: 3  •  Cholecalciferol (VITAMIN D3) 2000 units tablet, Take  by mouth., Disp: , Rfl:   •  DULoxetine (CYMBALTA) 60 MG capsule, Take 60 mg by mouth Daily., Disp: , Rfl:   •  losartan-hydrochlorothiazide (HYZAAR) 100-25 MG per tablet, Take 1 tablet by mouth Daily., Disp: , Rfl:   •  promethazine (PHENERGAN) 25 MG tablet, Take 25 mg by mouth Every 6 (Six) Hours As Needed for Nausea or Vomiting., Disp: , Rfl:    Past Medical History:   Diagnosis Date   • Allergic    • Anxiety    • Arthritis    • Fibromyalgia    • Headache    • Hypertension    • Injury of neck       Past Surgical History:   Procedure Laterality Date   • HYSTERECTOMY        Family History   Problem Relation Age of Onset   • Heart disease Mother    • Hypertension Mother    • Cancer Father    • Diabetes Father       Social History     Socioeconomic History   • Marital status: Single     Spouse name: Not on file   • Number of children: Not on file   • Years of education: Not on file   • Highest education level: Not on file   Tobacco Use   • Smoking status: Current Every Day Smoker     Packs/day: 0.50     Years: 10.00     Pack years: 5.00   Substance and Sexual Activity   • Alcohol use: No     Frequency: Never   • Drug use: No   • Sexual activity: Defer     Review of Systems   Gastrointestinal: Positive for nausea.   Musculoskeletal: Positive for neck pain.   Neurological: Positive for weakness, light-headedness and headache.   Psychiatric/Behavioral: Positive for decreased concentration and sleep disturbance.   All other systems reviewed and are negative.      Objective:    /80   Pulse 79   Ht 160 cm (63\")   Wt 105 kg (232 lb)   SpO2 97%   BMI 41.10 kg/m²     Neurology Exam:    General " apperance: Obese, wearing dark glasses due to photosensitivity    Mental status: Alert, awake and oriented to time place and person.    Recent and Remote memory: Intact.    Attention span and Concentration: Normal.     Language and Speech: Intact- No dysarthria.    Fluency, Naming , Repitition and Comprehension:  Intact    Cranial Nerves:   CN II: Visual fields are full. Intact. Fundi - Normal, No papillederma, Pupils - [place]  CN III, IV and VI: Extraocular movements are intact. Normal saccades.   CN V: Facial sensation is intact.   CN VII: Muscles of facial expression reveal no asymmetry. Intact.   CN VIII: Hearing is intact. Whispered voice intact.   CN IX and X: Palate elevates symmetrically. Intact  CN XI: Shoulder shrug is intact.   CN XII: Tongue is midline without evidence of atrophy or fasciculation.     Ophthalmoscopic exam of optic disc-normal    Motor:  Right UE muscle strength 5/5. Normal tone.     Left UE muscle strength 5/5. Normal tone.      Right LE muscle strength5/5. Normal tone.     Left LE muscle strength 5/5. Normal tone.      Sensory: Normal light touch, vibration and pinprick sensation bilaterally.    DTRs: 2+ bilaterally in upper and lower extremities.    Babinski: Negative bilaterally.    Co-ordination: Normal finger-to-nose, heel to shin B/L.    Rhomberg: Negative.    Gait: Normal.    Cardiovascular: Regular rate and rhythm without murmur, gallop or rub.    Assessment and Plan:  1. Chronic post-traumatic headache, not intractable  I will gradually increase her amitriptyline to 50 mg at night as she continues to have headaches as well as difficulty sleeping at night    2. Cervical radiculopathy  Minor disc bulges seen on MRI  I would recommend physical therapy.  She should speak to [employer]. to get that approved       Return in about 3 months (around 3/9/2020).         Abi Waller MD

## 2019-12-12 ENCOUNTER — TELEPHONE (OUTPATIENT)
Dept: NEUROLOGY | Facility: CLINIC | Age: 54
End: 2019-12-12

## 2019-12-12 NOTE — TELEPHONE ENCOUNTER
----- Message from Wale Boyd sent at 12/11/2019  3:18 PM EST -----  Contact: [phone]  Dayana Breaux, with [facility], left a vm in regards to mutual pt. Magda asks for pt's last office visit, work status report, and date of next visit faxed to them. Magda states to please call if you have any questions.    [phone]

## 2020-02-12 ENCOUNTER — OFFICE VISIT (OUTPATIENT)
Dept: NEUROLOGY | Facility: CLINIC | Age: 55
End: 2020-02-12

## 2020-02-12 VITALS
WEIGHT: 230 LBS | BODY MASS INDEX: 40.75 KG/M2 | HEIGHT: 63 IN | OXYGEN SATURATION: 98 % | HEART RATE: 85 BPM | SYSTOLIC BLOOD PRESSURE: 146 MMHG | DIASTOLIC BLOOD PRESSURE: 86 MMHG

## 2020-02-12 DIAGNOSIS — M54.12 CERVICAL RADICULOPATHY: ICD-10-CM

## 2020-02-12 DIAGNOSIS — G44.329 CHRONIC POST-TRAUMATIC HEADACHE, NOT INTRACTABLE: Primary | ICD-10-CM

## 2020-02-12 PROCEDURE — 99213 OFFICE O/P EST LOW 20 MIN: CPT | Performed by: PSYCHIATRY & NEUROLOGY

## 2020-02-12 RX ORDER — AMITRIPTYLINE HYDROCHLORIDE 25 MG/1
TABLET, FILM COATED ORAL
Qty: 60 TABLET | Refills: 5 | Status: SHIPPED | OUTPATIENT
Start: 2020-02-12 | End: 2020-03-17 | Stop reason: SDUPTHER

## 2020-02-12 NOTE — PROGRESS NOTES
Subjective:    CC: Magda Begum is seen today  for Headache       HPI:  Current visit- since the last visit patient's headaches have improved significantly since she increased her amitriptyline to 50 mg at night.  She still has headaches but they occur about once every 2 to 3 weeks.  These typically improve with over-the-counter medications however with her last one she was extremely nauseous and throwing up.  Her neck pain has also improved since she started getting physical therapy through the  [employer]. and goes for it about 2-3 times a week.    Last visit- since the last visit patient went back to work on November 15 however her headaches and neck pain were so debilitating that she had to take another week off.  After that she resumed work on November 22 and is able to work now by wearing dark glasses as she is extremely photosensitive.  Also continues to have headaches at least 2-3 times a week in addition to neck pain on the right that occasionally radiates down her arm.  Started taking amitriptyline 25 mg which has helped a little.  Also takes Zofran for nausea.  Her MRI of the cervical spine that I personally reviewed showed minor disc bulges but no severe spinal stenosis.  I had sent her to physical therapy for her neck pain however it was not approved by [employer].     Initial [id number] -American female with a history of hypertension, fibromyalgia presents with headaches and neck pain after she sustained her injury at work.  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  After that she went to the ER where she had a CT head and CT facial bones that was unremarkable as well as a CT cervical spine that showed multilevel degenerative changes without any fractures or dislocations.  Since then she has had frequent headaches about 4 times a week along with nausea and constant photophobia.   She typically takes Phenergan and gets a Toradol injection when the headaches become severe.  Also tried Elavil at low doses in the past that did not help much.  In addition she has been getting constant neck pain on the right that radiates down her right arm.  Has used heat and ice packs but that does not help much.    The following portions of the patient's history were reviewed today and updated as of 02/12/2020  : allergies, current medications, past family history, past medical history, past social history, past surgical history and problem list  These document will be scanned to patient's chart.      Current Outpatient Medications:   •  amitriptyline (ELAVIL) 25 MG tablet, Take 2 tablets at night, Disp: 60 tablet, Rfl: 5  •  Cholecalciferol (VITAMIN D3) 2000 units tablet, Take  by mouth., Disp: , Rfl:   •  DULoxetine (CYMBALTA) 60 MG capsule, Take 60 mg by mouth Daily., Disp: , Rfl:   •  losartan-hydrochlorothiazide (HYZAAR) 100-25 MG per tablet, Take 1 tablet by mouth Daily., Disp: , Rfl:   •  promethazine (PHENERGAN) 25 MG tablet, Take 25 mg by mouth Every 6 (Six) Hours As Needed for Nausea or Vomiting., Disp: , Rfl:    Past Medical History:   Diagnosis Date   • Allergic    • Anxiety    • Arthritis    • Fibromyalgia    • Headache    • Hypertension    • Injury of neck       Past Surgical History:   Procedure Laterality Date   • HYSTERECTOMY        Family History   Problem Relation Age of Onset   • Heart disease Mother    • Hypertension Mother    • Cancer Father    • Diabetes Father       Social History     Socioeconomic History   • Marital status: Single     Spouse name: Not on file   • Number of children: Not on file   • Years of education: Not on file   • Highest education level: Not on file   Tobacco Use   • Smoking status: Current Every Day Smoker     Packs/day: 0.50     Years: 10.00     Pack years: 5.00   Substance and Sexual Activity   • Alcohol use: No     Frequency: Never   • Drug use: No   • Sexual  "activity: Defer     Review of Systems   Gastrointestinal: Positive for nausea and vomiting.   Neurological: Positive for headache.   All other systems reviewed and are negative.      Objective:    /86   Pulse 85   Ht 160 cm (63\")   Wt 104 kg (230 lb)   SpO2 98%   BMI 40.74 kg/m²     Neurology Exam:    General apperance: NAD.  Wearing dark glasses as she is photosensitive    Mental status: Alert, awake and oriented to time place and person.    Recent and Remote memory: Intact.    Attention span and Concentration: Normal.     Language and Speech: Intact- No dysarthria.    Fluency, Naming , Repitition and Comprehension:  Intact    Cranial Nerves:   CN II: Visual fields are full. Intact. Fundi - Normal, No papillederma, Pupils - [place]  CN III, IV and VI: Extraocular movements are intact. Normal saccades.   CN V: Facial sensation is intact.   CN VII: Muscles of facial expression reveal no asymmetry. Intact.   CN VIII: Hearing is intact. Whispered voice intact.   CN IX and X: Palate elevates symmetrically. Intact  CN XI: Shoulder shrug is intact.   CN XII: Tongue is midline without evidence of atrophy or fasciculation.     Ophthalmoscopic exam of optic disc-normal    Motor:  Right UE muscle strength 5/5. Normal tone.     Left UE muscle strength 5/5. Normal tone.      Right LE muscle strength5/5. Normal tone.     Left LE muscle strength 5/5. Normal tone.      Sensory: Normal light touch, vibration and pinprick sensation bilaterally.    DTRs: 2+ bilaterally in upper and lower extremities.    Babinski: Negative bilaterally.    Co-ordination: Normal finger-to-nose, heel to shin B/L.    Rhomberg: Negative.    Gait: Normal.    Cardiovascular: Regular rate and rhythm without murmur, gallop or rub.    Assessment and Plan:  1. Chronic post-traumatic headache, not intractable  Continue amitriptyline 50 mg at night  I have also told her to keep herself well-hydrated  She can take a combination of Tylenol and ibuprofen if " the headaches are severe however she should avoid taking it more than once a week    2. Cervical radiculopathy  Minor changes on MRI C-spine  Continue PT       Return in about 4 months (around 6/12/2020).       Abi Waller MD

## 2020-03-17 ENCOUNTER — TELEPHONE (OUTPATIENT)
Dept: NEUROLOGY | Facility: CLINIC | Age: 55
End: 2020-03-17

## 2020-03-17 NOTE — TELEPHONE ENCOUNTER
Patient states that her headaches are becoming very severe.     Provider:     Caller: [phone]     Relationship to Patient: Self    Pharmacy:  [facility] - [address]     Phone Number:   [phone]  - [phone]      Reason for Call: Patient states that she is experiencing a severe headache since 03/15/2020. She is taking her prescribed medications but they are not giving any relief. She is extremely nauseas and the Phenegran is not giving any relief.          When did it start: 03/15/2020    Where is it located: Right side of head, throbs in the temple and down the middle of her head.     Characteristics of symptom/severity: 9 on the pain scale.     Timing- Is it constant or intermittent: Patient states that normally they come and go. This headache has been constant with no relief.     What makes it worse: Light and Loud noises.    What makes it better: N/A    What therapies/medications have you tried: Elavil and Phenergan.

## 2020-03-18 RX ORDER — RIZATRIPTAN BENZOATE 10 MG/1
TABLET ORAL
Qty: 8 TABLET | Refills: 3 | Status: SHIPPED | OUTPATIENT
Start: 2020-03-18

## 2020-03-18 NOTE — TELEPHONE ENCOUNTER
Tell her I have sent in a prescription of Maxalt for the headache.  She can take it with the Phenergan

## 2020-03-20 RX ORDER — AMITRIPTYLINE HYDROCHLORIDE 25 MG/1
TABLET, FILM COATED ORAL
Qty: 60 TABLET | Refills: 5 | Status: SHIPPED | OUTPATIENT
Start: 2020-03-20 | End: 2020-05-28 | Stop reason: SDUPTHER

## 2020-03-20 RX ORDER — PROMETHAZINE HYDROCHLORIDE 25 MG/1
25 TABLET ORAL EVERY 6 HOURS PRN
Qty: 30 TABLET | Refills: 2 | Status: SHIPPED | OUTPATIENT
Start: 2020-03-20 | End: 2020-04-03 | Stop reason: SDUPTHER

## 2020-04-02 ENCOUNTER — TELEPHONE (OUTPATIENT)
Dept: NEUROLOGY | Facility: CLINIC | Age: 55
End: 2020-04-02

## 2020-04-02 NOTE — TELEPHONE ENCOUNTER
JONELLE  [phone]    PT HAS TAKEN ALL HER RIZATRIPTAN 10 MG   THAT SHE RECEIVED BUT SHE NEVER GO THE PHENERGAN    SHE IS STILL HAVING HEADACHES AND HAVING A LOT OF NAUSEA    WHAT CAN SHE DO NOW?

## 2020-04-03 RX ORDER — PROMETHAZINE HYDROCHLORIDE 25 MG/1
25 TABLET ORAL EVERY 6 HOURS PRN
Qty: 30 TABLET | Refills: 2 | Status: SHIPPED | OUTPATIENT
Start: 2020-04-03 | End: 2020-12-07 | Stop reason: SDUPTHER

## 2020-04-29 ENCOUNTER — TELEPHONE (OUTPATIENT)
Dept: NEUROLOGY | Facility: CLINIC | Age: 55
End: 2020-04-29

## 2020-04-29 NOTE — TELEPHONE ENCOUNTER
Provider: DR. GUTHRIE  Caller: JONELLE BERG  Relationship to Patient: SELF  Phone Number: [phone]    Reason for Call: PT CALLLING DUE TO THE [facility] NEEDS A PA FOR THE MEDICATION   promethazine (PHENERGAN) 25 MG tablet   BEFORE THEY CAN FILL IT.

## 2020-05-28 ENCOUNTER — OFFICE VISIT (OUTPATIENT)
Dept: NEUROLOGY | Facility: CLINIC | Age: 55
End: 2020-05-28

## 2020-05-28 VITALS
OXYGEN SATURATION: 97 % | HEART RATE: 80 BPM | BODY MASS INDEX: 40.57 KG/M2 | HEIGHT: 63 IN | SYSTOLIC BLOOD PRESSURE: 142 MMHG | WEIGHT: 229 LBS | DIASTOLIC BLOOD PRESSURE: 90 MMHG

## 2020-05-28 DIAGNOSIS — G44.329 CHRONIC POST-TRAUMATIC HEADACHE, NOT INTRACTABLE: Primary | ICD-10-CM

## 2020-05-28 DIAGNOSIS — M54.12 CERVICAL RADICULOPATHY: ICD-10-CM

## 2020-05-28 PROCEDURE — 99214 OFFICE O/P EST MOD 30 MIN: CPT | Performed by: PSYCHIATRY & NEUROLOGY

## 2020-05-28 RX ORDER — AMITRIPTYLINE HYDROCHLORIDE 25 MG/1
TABLET, FILM COATED ORAL
Qty: 30 TABLET | Refills: 5 | Status: SHIPPED | OUTPATIENT
Start: 2020-05-28 | End: 2020-11-19 | Stop reason: SDUPTHER

## 2020-05-28 RX ORDER — HYDROCODONE BITARTRATE AND ACETAMINOPHEN 7.5; 325 MG/1; MG/1
1 TABLET ORAL 2 TIMES DAILY
COMMUNITY

## 2020-05-28 NOTE — PROGRESS NOTES
Subjective:    CC: Magda Begum is seen today  for Headache       HPI:  Current visit- since the last visit patient's headaches have become worse and are accompanied by nausea and photophobia.  She stopped taking amitriptyline because she thought she only had to take it as needed.  She states that her blood pressure is high today because she did not sleep at all last night due to a severe frontal headache.  Takes Maxalt along with Phenergan for abortive treatment.  Will be starting PT soon through [facility] for her neck pain radiating down her arm.    Last visit- since the last visit patient's headaches have improved significantly since she increased her amitriptyline to 50 mg at night.  She still has headaches but they occur about once every 2 to 3 weeks.  These typically improve with over-the-counter medications however with her last one she was extremely nauseous and throwing up.  Her neck pain has also improved since she started getting physical therapy through the  [facility] and goes for it about 2-3 times a week.    Last visit- since the last visit patient went back to work on November 15 however her headaches and neck pain were so debilitating that she had to take another week off.  After that she resumed work on November 22 and is able to work now by wearing dark glasses as she is extremely photosensitive.  Also continues to have headaches at least 2-3 times a week in addition to neck pain on the right that occasionally radiates down her arm.  Started taking amitriptyline 25 mg which has helped a little.  Also takes Zofran for nausea.  Her MRI of the cervical spine that I personally reviewed showed minor disc bulges but no severe spinal stenosis.  I had sent her to physical therapy for her neck pain however it was not approved by [employer].     Initial [id number] -American female with a history of hypertension, fibromyalgia presents with headaches and neck pain after  she sustained her injury at work.  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  After that she went to the ER where she had a CT head and CT facial bones that was unremarkable as well as a CT cervical spine that showed multilevel degenerative changes without any fractures or dislocations.  Since then she has had frequent headaches about 4 times a week along with nausea and constant photophobia.  She typically takes Phenergan and gets a Toradol injection when the headaches become severe.  Also tried Elavil at low doses in the past that did not help much.  In addition she has been getting constant neck pain on the right that radiates down her right arm.  Has used heat and ice packs but that does not help much.    The following portions of the patient's history were reviewed today and updated as of 05/28/2020  : allergies, current medications, past family history, past medical history, past social history, past surgical history and problem list  These document will be scanned to patient's chart.      Current Outpatient Medications:   •  DULoxetine (CYMBALTA) 60 MG capsule, Take 60 mg by mouth Daily., Disp: , Rfl:   •  HYDROcodone-acetaminophen (NORCO) 7.5-325 MG per tablet, Take 1 tablet by mouth 2 (Two) Times a Day., Disp: , Rfl:   •  losartan-hydrochlorothiazide (HYZAAR) 100-25 MG per tablet, Take 1 tablet by mouth Daily., Disp: , Rfl:   •  promethazine (PHENERGAN) 25 MG tablet, Take 1 tablet by mouth Every 6 (Six) Hours As Needed for Nausea or Vomiting., Disp: 30 tablet, Rfl: 2  •  rizatriptan (Maxalt) 10 MG tablet, Take 1 tablet at onset of headache.  May repeat once in 2 hours.  Do not take more than 2 tabs a day or 8 tabs a month, Disp: 8 tablet, Rfl: 3  •  amitriptyline (ELAVIL) 25 MG tablet, Take 1 tablet at night, Disp: 30 tablet, Rfl: 5  •  Cholecalciferol (VITAMIN D3) 2000 units tablet, Take  by mouth., Disp: , Rfl:   "  Past Medical History:   Diagnosis Date   • Allergic    • Anxiety    • Arthritis    • Fibromyalgia    • Headache    • Hypertension    • Injury of neck       Past Surgical History:   Procedure Laterality Date   • HYSTERECTOMY        Family History   Problem Relation Age of Onset   • Heart disease Mother    • Hypertension Mother    • Cancer Father    • Diabetes Father       Social History     Socioeconomic History   • Marital status: Single     Spouse name: Not on file   • Number of children: Not on file   • Years of education: Not on file   • Highest education level: Not on file   Tobacco Use   • Smoking status: Current Every Day Smoker     Packs/day: 0.50     Years: 10.00     Pack years: 5.00   • Smokeless tobacco: Never Used   Substance and Sexual Activity   • Alcohol use: No     Frequency: Never   • Drug use: No   • Sexual activity: Defer     Review of Systems   Neurological: Positive for headache (Migraine).   All other systems reviewed and are negative.      Objective:    /90   Pulse 80   Ht 160 cm (63\")   Wt 104 kg (229 lb)   SpO2 97%   BMI 40.57 kg/m²     Neurology Exam:    General apperance: Wearing dark glasses due to extreme photosensitivity.  Currently has a headache    Mental status: Alert, awake and oriented to time place and person.    Recent and Remote memory: Intact.    Attention span and Concentration: Normal.     Language and Speech: Intact- No dysarthria.    Fluency, Naming , Repitition and Comprehension:  Intact    Cranial Nerves:   CN II: Visual fields are full. Intact. Fundi - Normal, No papillederma, Pupils - [place]  CN III, IV and VI: Extraocular movements are intact. Normal saccades.   CN V: Facial sensation is intact.   CN VII: Muscles of facial expression reveal no asymmetry. Intact.   CN VIII: Hearing is intact. Whispered voice intact.   CN IX and X: Palate elevates symmetrically. Intact  CN XI: Shoulder shrug is intact.   CN XII: Tongue is midline without evidence of atrophy or " fasciculation.     Ophthalmoscopic exam of optic disc-normal    Motor:  Right UE muscle strength 5/5. Normal tone.     Left UE muscle strength 5/5. Normal tone.      Right LE muscle strength5/5. Normal tone.     Left LE muscle strength 5/5. Normal tone.      Sensory: Normal light touch, vibration and pinprick sensation bilaterally.    DTRs: 2+ bilaterally in upper and lower extremities.    Babinski: Negative bilaterally.    Co-ordination: Normal finger-to-nose, heel to shin B/L.    Rhomberg: Negative.    Gait: Normal.    Cardiovascular: Regular rate and rhythm without murmur, gallop or rub.    Assessment and Plan:  1. Chronic post-traumatic headache, not intractable  I have asked her to restart amitriptyline 25 mg at night.  She will call me back in 1 month to let me know how she is doing.  If the headaches have not improved much I may increase her dose  She can continue Maxalt with Phenergan as needed for abortive treatment of her headaches    2. Cervical radiculopathy  Will be resuming PT next week through [employer].       Return in about 2 months (around 7/28/2020).       Abi Waller MD

## 2020-07-27 ENCOUNTER — TELEPHONE (OUTPATIENT)
Dept: NEUROLOGY | Facility: CLINIC | Age: 55
End: 2020-07-27

## 2020-07-27 NOTE — TELEPHONE ENCOUNTER
PATIENT CALLED STATING SHE NEEDED TO CX HER APPOINTMENT FOR WEDS. 7-@ 9AM DUE TO HER HAVING A MIGRAINE ACOMPIED BY NAUSEA AND VOMITING.     I TRIED TO R/S AND FIRST AVAILABLE WAS 9-2-20, SHE WAS NOT COMFORTABLE WITH THAT.    PLEASE ADVISE    [phone]  JONELLE

## 2020-07-27 NOTE — TELEPHONE ENCOUNTER
Called pt and LVM concerning scheduling fu appt. Asked to please give office a call back. Thanks.

## 2020-11-19 ENCOUNTER — OFFICE VISIT (OUTPATIENT)
Dept: NEUROLOGY | Facility: CLINIC | Age: 55
End: 2020-11-19

## 2020-11-19 VITALS
BODY MASS INDEX: 42.17 KG/M2 | SYSTOLIC BLOOD PRESSURE: 122 MMHG | TEMPERATURE: 97.1 F | WEIGHT: 238 LBS | HEIGHT: 63 IN | HEART RATE: 76 BPM | OXYGEN SATURATION: 99 % | DIASTOLIC BLOOD PRESSURE: 72 MMHG

## 2020-11-19 DIAGNOSIS — G44.329 CHRONIC POST-TRAUMATIC HEADACHE, NOT INTRACTABLE: Primary | ICD-10-CM

## 2020-11-19 PROCEDURE — 99213 OFFICE O/P EST LOW 20 MIN: CPT | Performed by: PSYCHIATRY & NEUROLOGY

## 2020-11-19 RX ORDER — MELOXICAM 15 MG/1
TABLET ORAL
COMMUNITY
Start: 2020-11-12

## 2020-11-19 RX ORDER — HYDROXYZINE PAMOATE 25 MG/1
CAPSULE ORAL
COMMUNITY
Start: 2020-11-05

## 2020-11-19 RX ORDER — PREGABALIN 75 MG/1
100 CAPSULE ORAL
COMMUNITY
Start: 2020-11-12

## 2020-11-19 RX ORDER — AMITRIPTYLINE HYDROCHLORIDE 25 MG/1
TABLET, FILM COATED ORAL
Qty: 30 TABLET | Refills: 5 | Status: SHIPPED | OUTPATIENT
Start: 2020-11-19 | End: 2021-01-14

## 2020-11-19 NOTE — PROGRESS NOTES
Subjective:    CC: Magda Begum is seen today  for Headache       HPI:  Current visit-patient states she was doing well in terms of her headaches when she started taking amitriptyline 25 mg at night as she was only having about 3 headaches each month however she ran out of it a few months ago (even though I had sent her several refills) and since then her headaches have worsened. Since her last visit she was also started on Cymbalta and Lyrica 75 mg twice a day for fibromyalgia. For her neck pain she has finished physical therapy through [facility].    Last visit-since the last visit patient's headaches have become worse and are accompanied by nausea and photophobia.  She stopped taking amitriptyline because she thought she only had to take it as needed.  She states that her blood pressure is high today because she did not sleep at all last night due to a severe frontal headache.  Takes Maxalt along with Phenergan for abortive treatment.  Will be starting PT soon through [facility] for her neck pain radiating down her arm.    Last visit- since the last visit patient's headaches have improved significantly since she increased her amitriptyline to 50 mg at night.  She still has headaches but they occur about once every 2 to 3 weeks.  These typically improve with over-the-counter medications however with her last one she was extremely nauseous and throwing up.  Her neck pain has also improved since she started getting physical therapy through the  [facility] and goes for it about 2-3 times a week.    Last visit- since the last visit patient went back to work on November 15 however her headaches and neck pain were so debilitating that she had to take another week off.  After that she resumed work on November 22 and is able to work now by wearing dark glasses as she is extremely photosensitive.  Also continues to have headaches at least 2-3 times a week in addition to neck pain on the right that  occasionally radiates down her arm.  Started taking amitriptyline 25 mg which has helped a little.  Also takes Zofran for nausea.  Her MRI of the cervical spine that I personally reviewed showed minor disc bulges but no severe spinal stenosis.  I had sent her to physical therapy for her neck pain however it was not approved by [employer].     Initial [age]ko-19-znig-old -American female with a history of hypertension, fibromyalgia presents with headaches and neck pain after she sustained her injury at work.  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  After that she went to the ER where she had a CT head and CT facial bones that was unremarkable as well as a CT cervical spine that showed multilevel degenerative changes without any fractures or dislocations.  Since then she has had frequent headaches about 4 times a week along with nausea and constant photophobia.  She typically takes Phenergan and gets a Toradol injection when the headaches become severe.  Also tried Elavil at low doses in the past that did not help much.  In addition she has been getting constant neck pain on the right that radiates down her right arm.  Has used heat and ice packs but that does not help much.    The following portions of the patient's history were reviewed today and updated as of 05/28/2020  : allergies, current medications, past family history, past medical history, past social history, past surgical history and problem list  These document will be scanned to patient's chart.      Current Outpatient Medications:   •  DULoxetine (CYMBALTA) 60 MG capsule, Take 60 mg by mouth Daily., Disp: , Rfl:   •  HYDROcodone-acetaminophen (NORCO) 7.5-325 MG per tablet, Take 1 tablet by mouth 2 (Two) Times a Day., Disp: , Rfl:   •  losartan-hydrochlorothiazide (HYZAAR) 100-25 MG per tablet, Take 1 tablet by mouth Daily., Disp: , Rfl:   •   "amitriptyline (ELAVIL) 25 MG tablet, Take 1 tablet at night, Disp: 30 tablet, Rfl: 5  •  hydrOXYzine pamoate (VISTARIL) 25 MG capsule, , Disp: , Rfl:   •  meloxicam (MOBIC) 15 MG tablet, , Disp: , Rfl:   •  pregabalin (LYRICA) 75 MG capsule, , Disp: , Rfl:   •  promethazine (PHENERGAN) 25 MG tablet, Take 1 tablet by mouth Every 6 (Six) Hours As Needed for Nausea or Vomiting., Disp: 30 tablet, Rfl: 2  •  rizatriptan (Maxalt) 10 MG tablet, Take 1 tablet at onset of headache.  May repeat once in 2 hours.  Do not take more than 2 tabs a day or 8 tabs a month, Disp: 8 tablet, Rfl: 3   Past Medical History:   Diagnosis Date   • Allergic    • Anxiety    • Arthritis    • Fibromyalgia    • Headache    • Hypertension    • Injury of neck       Past Surgical History:   Procedure Laterality Date   • HYSTERECTOMY        Family History   Problem Relation Age of Onset   • Heart disease Mother    • Hypertension Mother    • Cancer Father    • Diabetes Father       Social History     Socioeconomic History   • Marital status: Single     Spouse name: Not on file   • Number of children: Not on file   • Years of education: Not on file   • Highest education level: Not on file   Tobacco Use   • Smoking status: Current Every Day Smoker     Packs/day: 0.50     Years: 10.00     Pack years: 5.00   • Smokeless tobacco: Never Used   Substance and Sexual Activity   • Alcohol use: No     Frequency: Never   • Drug use: No   • Sexual activity: Defer     Review of Systems   Neurological: Positive for headache (Migraine).   All other systems reviewed and are negative.      Objective:    /72   Pulse 76   Temp 97.1 °F (36.2 °C) (Temporal)   Ht 160 cm (63\")   Wt 108 kg (238 lb)   SpO2 99%   BMI 42.16 kg/m²     Neurology Exam:    General apperance: Wearing dark glasses due to extreme photosensitivity.  Currently has a headache    Mental status: Alert, awake and oriented to time place and person.    Recent and Remote memory: Intact.    Attention " span and Concentration: Normal.     Language and Speech: Intact- No dysarthria.    Fluency, Naming , Repitition and Comprehension:  Intact    Cranial Nerves:   CN II: Visual fields are full. Intact. Fundi - Normal, No papillederma, Pupils - [place]  CN III, IV and VI: Extraocular movements are intact. Normal saccades.   CN V: Facial sensation is intact.   CN VII: Muscles of facial expression reveal no asymmetry. Intact.   CN VIII: Hearing is intact. Whispered voice intact.   CN IX and X: Palate elevates symmetrically. Intact  CN XI: Shoulder shrug is intact.   CN XII: Tongue is midline without evidence of atrophy or fasciculation.     Ophthalmoscopic exam of optic disc-normal    Motor:  Right UE muscle strength 5/5. Normal tone.     Left UE muscle strength 5/5. Normal tone.      Right LE muscle strength5/5. Normal tone.     Left LE muscle strength 5/5. Normal tone.      Sensory: Normal light touch, vibration and pinprick sensation bilaterally.    DTRs: 2+ bilaterally in upper and lower extremities.    Babinski: Negative bilaterally.    Co-ordination: Normal finger-to-nose, heel to shin B/L.    Rhomberg: Negative.    Gait: Normal.    Cardiovascular: Regular rate and rhythm without murmur, gallop or rub.    Assessment and Plan:  1. Chronic post-traumatic headache, not intractable  I have asked her to restart amitriptyline 25 mg at night.    If the headaches have not improved much I may increase her dose  She can continue Maxalt with Phenergan as needed for abortive treatment of her headaches  I have also told her to keep a headache log  She is also on Cymbalta and Lyrica for fibromyalgia      Return in about 6 weeks (around 12/31/2020).       Abi Waller MD

## 2020-12-07 DIAGNOSIS — G44.329 CHRONIC POST-TRAUMATIC HEADACHE, NOT INTRACTABLE: Primary | ICD-10-CM

## 2020-12-07 RX ORDER — TOPIRAMATE 50 MG/1
50 TABLET, FILM COATED ORAL 2 TIMES DAILY
Qty: 60 TABLET | Refills: 5 | Status: SHIPPED | OUTPATIENT
Start: 2020-12-07 | End: 2020-12-15 | Stop reason: SDUPTHER

## 2020-12-07 RX ORDER — TOPIRAMATE 25 MG/1
TABLET ORAL
Qty: 42 TABLET | Refills: 0 | Status: SHIPPED | OUTPATIENT
Start: 2020-12-07 | End: 2020-12-15 | Stop reason: SDUPTHER

## 2020-12-07 RX ORDER — PROMETHAZINE HYDROCHLORIDE 25 MG/1
25 TABLET ORAL EVERY 6 HOURS PRN
Qty: 30 TABLET | Refills: 2 | Status: SHIPPED | OUTPATIENT
Start: 2020-12-07 | End: 2020-12-15 | Stop reason: SDUPTHER

## 2020-12-07 NOTE — TELEPHONE ENCOUNTER
PT WOULD LIKE THIS REFILLED TO TRY TO STOP THE NAUSEA. PT STATES THAT SHE HAS BEEN OUT OF IT. SHE WOULD LIKE THIS SENT TO THE [facility] [address] AS [employer] WILL NOT PAY FOR THIS. [id number]. [facility].   PHARMACY PHONE NUMBER [phone]

## 2020-12-08 ENCOUNTER — TELEPHONE (OUTPATIENT)
Dept: NEUROLOGY | Facility: CLINIC | Age: 55
End: 2020-12-08

## 2020-12-08 NOTE — TELEPHONE ENCOUNTER
SHAYNE FROM [facility] CALLED AND WAS WANTING TO CONFIRM THAT OFFICE HAD RECEIVED MEDICAL NECESSITY FORM FOR TOPIRAMTE 50 MG AND PROMETHAZINE 25MG.    PLEASE ADVISE [facility] WITH UPDATE.    PH: [phone]

## 2020-12-10 NOTE — TELEPHONE ENCOUNTER
Paperwork has been filled out just waiting for Dr. Waller's signature so the form can be faxed to [facility]

## 2020-12-15 RX ORDER — PROMETHAZINE HYDROCHLORIDE 25 MG/1
25 TABLET ORAL EVERY 6 HOURS PRN
Qty: 30 TABLET | Refills: 2 | Status: SHIPPED | OUTPATIENT
Start: 2020-12-15

## 2020-12-15 RX ORDER — TOPIRAMATE 50 MG/1
50 TABLET, FILM COATED ORAL 2 TIMES DAILY
Qty: 60 TABLET | Refills: 5 | Status: SHIPPED | OUTPATIENT
Start: 2020-12-15 | End: 2020-12-18 | Stop reason: SDUPTHER

## 2020-12-15 RX ORDER — TOPIRAMATE 25 MG/1
TABLET ORAL
Qty: 42 TABLET | Refills: 0 | Status: SHIPPED | OUTPATIENT
Start: 2020-12-15 | End: 2020-12-18 | Stop reason: SDUPTHER

## 2020-12-15 NOTE — TELEPHONE ENCOUNTER
Pt calling stating that the [facility] [place] [address] pharmacy has not received the order for the topiramate (Topamax) 25mg & 50mg and aslo the prescription for the promethazine (PHENERGAN) 25 MG tablet. Pt doesn't think the [facility] will pay for the medications. Please call her back at [phone] and let her know when the medication order has been sent to [facility].

## 2020-12-15 NOTE — TELEPHONE ENCOUNTER
Medication refills have been sent to the pharmacy on file.     Still waiting on paperwork to be signed by Dr. Waller.

## 2020-12-16 ENCOUNTER — TELEPHONE (OUTPATIENT)
Dept: NEUROLOGY | Facility: CLINIC | Age: 55
End: 2020-12-16

## 2020-12-16 NOTE — TELEPHONE ENCOUNTER
Greg from [facility] called and stated the paperwork they faxed over in regards to topiramate 50mg and promethazine (PHENERGAN) 25 MG tablet were sent back incomplete.     Please advise and resend    Phone number - [phone]

## 2020-12-18 DIAGNOSIS — G44.329 CHRONIC POST-TRAUMATIC HEADACHE, NOT INTRACTABLE: ICD-10-CM

## 2020-12-18 RX ORDER — TOPIRAMATE 25 MG/1
TABLET ORAL
Qty: 42 TABLET | Refills: 0 | Status: SHIPPED | OUTPATIENT
Start: 2020-12-18 | End: 2021-01-14

## 2020-12-18 RX ORDER — TOPIRAMATE 50 MG/1
50 TABLET, FILM COATED ORAL 2 TIMES DAILY
Qty: 60 TABLET | Refills: 5 | Status: SHIPPED | OUTPATIENT
Start: 2020-12-18

## 2020-12-21 ENCOUNTER — TELEPHONE (OUTPATIENT)
Dept: NEUROLOGY | Facility: CLINIC | Age: 55
End: 2020-12-21

## 2020-12-21 NOTE — TELEPHONE ENCOUNTER
SHAYNE CALLED FROM [facility] AND IS WANTING TO CONSULT WITH SOMEONE ON THE RX'S PHENERGAN 25MG AND TOPAMAX 50MG. MEDICAL NECESSITY FORMS FOR THESE KEEP BEING SENT OUT INCOMPLETE. THEY ARE NEEDING TO KNOW IF INDICATION IS FOR PAIN OR OTHER AND THEY NEED AN ICD 10 DX CODE.    PLEASE ADVISE.    PH: [phone]  FAX: [phone]

## 2021-01-14 ENCOUNTER — OFFICE VISIT (OUTPATIENT)
Dept: NEUROLOGY | Facility: CLINIC | Age: 56
End: 2021-01-14

## 2021-01-14 VITALS
TEMPERATURE: 96.8 F | DIASTOLIC BLOOD PRESSURE: 88 MMHG | SYSTOLIC BLOOD PRESSURE: 138 MMHG | HEART RATE: 80 BPM | OXYGEN SATURATION: 98 %

## 2021-01-14 DIAGNOSIS — M54.12 CERVICAL RADICULOPATHY: ICD-10-CM

## 2021-01-14 DIAGNOSIS — G44.329 CHRONIC POST-TRAUMATIC HEADACHE, NOT INTRACTABLE: Primary | ICD-10-CM

## 2021-01-14 PROCEDURE — 99214 OFFICE O/P EST MOD 30 MIN: CPT | Performed by: PSYCHIATRY & NEUROLOGY

## 2021-01-14 NOTE — PROGRESS NOTES
Subjective:    CC: Magda Begum is seen today for Headache       HPI:  Current visit-since the last visit patient stopped taking amitriptyline as it was not helping.  After that I had started her on Topamax gradually increasing to 50 mg twice a day.  She has reached that dose now and her headache frequency has improved.  She had about 6 severe headaches last month for which she took Maxalt however that did not help much.  She also continues to have neck pain and stiffness.    Last visit- patient states she was doing well in terms of her headaches when she started taking amitriptyline 25 mg at night as she was only having about 3 headaches each month however she ran out of it a few months ago (even though I had sent her several refills) and since then her headaches have worsened. Since her last visit she was also started on Cymbalta and Lyrica 75 mg twice a day for fibromyalgia. For her neck pain she has finished physical therapy through [facility].    Last visit-since the last visit patient's headaches have become worse and are accompanied by nausea and photophobia.  She stopped taking amitriptyline because she thought she only had to take it as needed.  She states that her blood pressure is high today because she did not sleep at all last night due to a severe frontal headache.  Takes Maxalt along with Phenergan for abortive treatment.  Will be starting PT soon through [facility] for her neck pain radiating down her arm.    Last visit- since the last visit patient's headaches have improved significantly since she increased her amitriptyline to 50 mg at night.  She still has headaches but they occur about once every 2 to 3 weeks.  These typically improve with over-the-counter medications however with her last one she was extremely nauseous and throwing up.  Her neck pain has also improved since she started getting physical therapy through the  [facility]. and goes for it about 2-3 times a  week.    Last visit- since the last visit patient went back to work on November 15 however her headaches and neck pain were so debilitating that she had to take another week off.  After that she resumed work on November 22 and is able to work now by wearing dark glasses as she is extremely photosensitive.  Also continues to have headaches at least 2-3 times a week in addition to neck pain on the right that occasionally radiates down her arm.  Started taking amitriptyline 25 mg which has helped a little.  Also takes Zofran for nausea.  Her MRI of the cervical spine that I personally reviewed showed minor disc bulges but no severe spinal stenosis.  I had sent her to physical therapy for her neck pain however it was not approved by [employer].     Initial [id number] -American female with a history of hypertension, fibromyalgia presents with headaches and neck pain after she sustained her injury at work.  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  After that she went to the ER where she had a CT head and CT facial bones that was unremarkable as well as a CT cervical spine that showed multilevel degenerative changes without any fractures or dislocations.  Since then she has had frequent headaches about 4 times a week along with nausea and constant photophobia.  She typically takes Phenergan and gets a Toradol injection when the headaches become severe.  Also tried Elavil at low doses in the past that did not help much.  In addition she has been getting constant neck pain on the right that radiates down her right arm.  Has used heat and ice packs but that does not help much.    The following portions of the patient's history were reviewed today and updated as of 05/28/2020  : allergies, current medications, past family history, past medical history, past social history, past surgical history and problem list  These  document will be scanned to patient's chart.      Current Outpatient Medications:   •  DULoxetine (CYMBALTA) 60 MG capsule, Take 60 mg by mouth Daily., Disp: , Rfl:   •  HYDROcodone-acetaminophen (NORCO) 7.5-325 MG per tablet, Take 1 tablet by mouth 2 (Two) Times a Day., Disp: , Rfl:   •  hydrOXYzine pamoate (VISTARIL) 25 MG capsule, , Disp: , Rfl:   •  losartan-hydrochlorothiazide (HYZAAR) 100-25 MG per tablet, Take 1 tablet by mouth Daily., Disp: , Rfl:   •  pregabalin (LYRICA) 75 MG capsule, 100 mg., Disp: , Rfl:   •  promethazine (PHENERGAN) 25 MG tablet, Take 1 tablet by mouth Every 6 (Six) Hours As Needed for Nausea or Vomiting., Disp: 30 tablet, Rfl: 2  •  rizatriptan (Maxalt) 10 MG tablet, Take 1 tablet at onset of headache.  May repeat once in 2 hours.  Do not take more than 2 tabs a day or 8 tabs a month, Disp: 8 tablet, Rfl: 3  •  topiramate (Topamax) 50 MG tablet, Take 1 tablet by mouth 2 (Two) Times a Day., Disp: 60 tablet, Rfl: 5  •  meloxicam (MOBIC) 15 MG tablet, , Disp: , Rfl:   •  ubrogepant (ubrogepant) 100 MG tablet, Take 1 tablet by mouth Daily As Needed (At the onset of her headache). May repeat in 2 hours.  Do not take over 2 tabs a day or 10  a month, Disp: 10 tablet, Rfl: 3   Past Medical History:   Diagnosis Date   • Allergic    • Anxiety    • Arthritis    • Fibromyalgia    • Headache    • Hypertension    • Injury of neck       Past Surgical History:   Procedure Laterality Date   • HYSTERECTOMY        Family History   Problem Relation Age of Onset   • Heart disease Mother    • Hypertension Mother    • Cancer Father    • Diabetes Father       Social History     Socioeconomic History   • Marital status: Single     Spouse name: Not on file   • Number of children: Not on file   • Years of education: Not on file   • Highest education level: Not on file   Tobacco Use   • Smoking status: Current Every Day Smoker     Packs/day: 0.50     Years: 10.00     Pack years: 5.00   • Smokeless tobacco: Never  Used   Substance and Sexual Activity   • Alcohol use: No     Frequency: Never   • Drug use: No   • Sexual activity: Defer     Review of Systems   Neurological: Positive for headache (Migraine).   All other systems reviewed and are negative.      Objective:    /88   Pulse 80   Temp 96.8 °F (36 °C)   SpO2 98%     Neurology Exam:    General apperance: Wearing dark glasses due to extreme photosensitivity.  Currently has a headache    Mental status: Alert, awake and oriented to time place and person.    Recent and Remote memory: Intact.    Attention span and Concentration: Normal.     Language and Speech: Intact- No dysarthria.    Fluency, Naming , Repitition and Comprehension:  Intact    Cranial Nerves:   CN II: Visual fields are full. Intact. Fundi - Normal, No papillederma, Pupils - [place]  CN III, IV and VI: Extraocular movements are intact. Normal saccades.   CN V: Facial sensation is intact.   CN VII: Muscles of facial expression reveal no asymmetry. Intact.   CN VIII: Hearing is intact. Whispered voice intact.   CN IX and X: Palate elevates symmetrically. Intact  CN XI: Shoulder shrug is intact.   CN XII: Tongue is midline without evidence of atrophy or fasciculation.     Ophthalmoscopic exam of optic disc-normal    Motor:  Right UE muscle strength 5/5. Normal tone.     Left UE muscle strength 5/5. Normal tone.      Right LE muscle strength5/5. Normal tone.     Left LE muscle strength 5/5. Normal tone.      Sensory: Normal light touch, vibration and pinprick sensation bilaterally.    DTRs: 2+ bilaterally in upper and lower extremities.    Babinski: Negative bilaterally.    Co-ordination: Normal finger-to-nose, heel to shin B/L.    Rhomberg: Negative.    Gait: Normal.    Cardiovascular: Regular rate and rhythm without murmur, gallop or rub.    Assessment and Plan:    1.  Tension headaches  I have asked her to continue Topamax 50 mg twice a day.  She should keep herself extremely well-hydrated on  Topamax  For abortive treatment of her headaches I have given her samples of Ubrelvy 100 mg to try.  Will also send in a prescription  She can keep taking Phenergan as needed for her nausea  I will give her a PT referral for her neck pain/stiffness  She is also on Cymbalta and Lyrica for fibromyalgia      Return in about 3 months (around 4/14/2021).       Abi Waller MD

## 2021-01-21 ENCOUNTER — TELEPHONE (OUTPATIENT)
Dept: NEUROLOGY | Facility: CLINIC | Age: 56
End: 2021-01-21

## 2021-01-21 NOTE — TELEPHONE ENCOUNTER
LVM for patient informing I am working on a PA for Ubrelvy. and just waiting on a approval or denial for this request.

## 2021-01-21 NOTE — TELEPHONE ENCOUNTER
Pt called in stating [facility] pharmacy on [address] stated they need a PA for the Ubrelvy rx that was sent. Stated they faxed over the PA today and pt wanted to make sure that is taken care of so she doesn't have to keep coming into the office to get samples.     Please advise.     Thanks

## 2021-01-22 ENCOUNTER — TELEPHONE (OUTPATIENT)
Dept: NEUROLOGY | Facility: CLINIC | Age: 56
End: 2021-01-22

## 2021-01-26 ENCOUNTER — DOCUMENTATION (OUTPATIENT)
Dept: NEUROLOGY | Facility: CLINIC | Age: 56
End: 2021-01-26

## 2021-02-01 NOTE — TELEPHONE ENCOUNTER
Spoke to Pt and they asked for referral to be sent to [facility] since they will be seeing them for other issues already.

## 2021-03-02 DIAGNOSIS — G44.329 CHRONIC POST-TRAUMATIC HEADACHE, NOT INTRACTABLE: Primary | ICD-10-CM

## 2021-03-08 ENCOUNTER — TELEPHONE (OUTPATIENT)
Dept: NEUROLOGY | Facility: CLINIC | Age: 56
End: 2021-03-08

## 2021-03-08 NOTE — TELEPHONE ENCOUNTER
Pharmacy Name:  [facility]     Pharmacy representative name: TOM    Pharmacy representative phone number: [phone]    REF # [id number]    What medication are you calling in regards to:   ubrogepant (ubrogepant) 100 MG tablet    What question does the pharmacy have:  CALLER  WANTED TO VERIFY IF ANY OTHER MEDS WERE TRIED AND IF YES WHICH ONES  FOR THE PRE AUTH ON ABOVE MEDICATION     Who is the provider that prescribed the medication:  DR GUTHRIE     Additional notes:

## 2021-03-18 ENCOUNTER — TELEPHONE (OUTPATIENT)
Dept: NEUROLOGY | Facility: CLINIC | Age: 56
End: 2021-03-18

## 2021-03-18 NOTE — TELEPHONE ENCOUNTER
Octavia has been informed via fax, to contact our medical records dept. Fax number was provided.   -TMT

## 2021-03-18 NOTE — TELEPHONE ENCOUNTER
Caller: BRENDA FRIAS CALLING ON BEHALF OF [facility] [employer]     Best call back number: [phone] [id number]    What form or medical record are you requesting: ALL MEDICAL RECORDS FOR NEURO FROM 08/01/2019 TO PRESENT    Who is requesting this form or medical record from you: BRENDA FRIAS CALLING ON BEHALF OF [facility]     How would you like to receive the form or medical records (pick-up, mail, fax): FAX  If fax, what is the fax number: [phone] ATTENTION TO BRENDA  If mail, what is the address:   If pick-up, provide patient with address and location details    Timeframe paperwork needed: ASAP BEFORE April 29TH    Additional notes: WILL SEND ANOTHER COPY OF REQUEST FOR THIS TO OUR OFFICE